# Patient Record
Sex: MALE | Race: BLACK OR AFRICAN AMERICAN
[De-identification: names, ages, dates, MRNs, and addresses within clinical notes are randomized per-mention and may not be internally consistent; named-entity substitution may affect disease eponyms.]

---

## 2019-06-02 ENCOUNTER — HOSPITAL ENCOUNTER (EMERGENCY)
Dept: HOSPITAL 92 - ERS | Age: 24
Discharge: HOME | End: 2019-06-02
Payer: COMMERCIAL

## 2019-06-02 DIAGNOSIS — F41.9: ICD-10-CM

## 2019-06-02 DIAGNOSIS — K62.89: Primary | ICD-10-CM

## 2019-06-02 LAB
BASOPHILS # BLD AUTO: 0 THOU/UL (ref 0–0.2)
BASOPHILS NFR BLD AUTO: 0.3 % (ref 0–1)
EOSINOPHIL # BLD AUTO: 0.1 THOU/UL (ref 0–0.7)
EOSINOPHIL NFR BLD AUTO: 1.1 % (ref 0–10)
HGB BLD-MCNC: 14.9 G/DL (ref 14–18)
LYMPHOCYTES # BLD: 2 THOU/UL (ref 1.2–3.4)
LYMPHOCYTES NFR BLD AUTO: 18.8 % (ref 21–51)
MCH RBC QN AUTO: 31.1 PG (ref 27–31)
MCV RBC AUTO: 90.8 FL (ref 78–98)
MONOCYTES # BLD AUTO: 1 THOU/UL (ref 0.11–0.59)
MONOCYTES NFR BLD AUTO: 9.2 % (ref 0–10)
NEUTROPHILS # BLD AUTO: 7.3 THOU/UL (ref 1.4–6.5)
NEUTROPHILS NFR BLD AUTO: 70.7 % (ref 42–75)
PLATELET # BLD AUTO: 253 THOU/UL (ref 130–400)
RBC # BLD AUTO: 4.78 MILL/UL (ref 4.7–6.1)
WBC # BLD AUTO: 10.4 THOU/UL (ref 4.8–10.8)

## 2019-06-02 PROCEDURE — 85025 COMPLETE CBC W/AUTO DIFF WBC: CPT

## 2019-06-02 PROCEDURE — 36415 COLL VENOUS BLD VENIPUNCTURE: CPT

## 2019-06-02 PROCEDURE — 82274 ASSAY TEST FOR BLOOD FECAL: CPT

## 2019-06-02 PROCEDURE — 99283 EMERGENCY DEPT VISIT LOW MDM: CPT

## 2019-12-08 ENCOUNTER — HOSPITAL ENCOUNTER (EMERGENCY)
Dept: HOSPITAL 92 - ERS | Age: 24
Discharge: HOME | End: 2019-12-08
Payer: COMMERCIAL

## 2019-12-08 DIAGNOSIS — F41.9: ICD-10-CM

## 2019-12-08 DIAGNOSIS — L01.00: ICD-10-CM

## 2019-12-08 DIAGNOSIS — J06.9: Primary | ICD-10-CM

## 2019-12-08 PROCEDURE — 71046 X-RAY EXAM CHEST 2 VIEWS: CPT

## 2019-12-08 PROCEDURE — 87804 INFLUENZA ASSAY W/OPTIC: CPT

## 2019-12-08 PROCEDURE — 87081 CULTURE SCREEN ONLY: CPT

## 2019-12-08 PROCEDURE — 87430 STREP A AG IA: CPT

## 2019-12-08 NOTE — RAD
Chest 2 views



HISTORY: Cough. Fever.



FINDINGS: Cardiac silhouette and pulmonary vasculature are unremarkable. Mediastinum is midline. Ther
e is no confluent airspace consolidation, pneumothorax, or pleural fluid evident.











IMPRESSION: Normal exam.



Reported By: MANDIE Christian 

Electronically Signed:  12/8/2019 7:56 PM

## 2020-06-16 ENCOUNTER — HOSPITAL ENCOUNTER (EMERGENCY)
Dept: HOSPITAL 92 - ERS | Age: 25
Discharge: HOME | End: 2020-06-16
Payer: COMMERCIAL

## 2020-06-16 DIAGNOSIS — F41.9: ICD-10-CM

## 2020-06-16 DIAGNOSIS — Z20.828: Primary | ICD-10-CM

## 2020-06-16 PROCEDURE — 87635 SARS-COV-2 COVID-19 AMP PRB: CPT

## 2020-06-16 PROCEDURE — 99283 EMERGENCY DEPT VISIT LOW MDM: CPT

## 2020-06-16 PROCEDURE — U0003 INFECTIOUS AGENT DETECTION BY NUCLEIC ACID (DNA OR RNA); SEVERE ACUTE RESPIRATORY SYNDROME CORONAVIRUS 2 (SARS-COV-2) (CORONAVIRUS DISEASE [COVID-19]), AMPLIFIED PROBE TECHNIQUE, MAKING USE OF HIGH THROUGHPUT TECHNOLOGIES AS DESCRIBED BY CMS-2020-01-R: HCPCS

## 2020-07-10 ENCOUNTER — HOSPITAL ENCOUNTER (EMERGENCY)
Dept: HOSPITAL 92 - ERS | Age: 25
Discharge: HOME | End: 2020-07-10
Payer: COMMERCIAL

## 2020-07-10 DIAGNOSIS — F41.9: ICD-10-CM

## 2020-07-10 DIAGNOSIS — J02.0: Primary | ICD-10-CM

## 2020-07-10 DIAGNOSIS — Z20.828: ICD-10-CM

## 2020-07-10 PROCEDURE — U0003 INFECTIOUS AGENT DETECTION BY NUCLEIC ACID (DNA OR RNA); SEVERE ACUTE RESPIRATORY SYNDROME CORONAVIRUS 2 (SARS-COV-2) (CORONAVIRUS DISEASE [COVID-19]), AMPLIFIED PROBE TECHNIQUE, MAKING USE OF HIGH THROUGHPUT TECHNOLOGIES AS DESCRIBED BY CMS-2020-01-R: HCPCS

## 2020-07-10 PROCEDURE — 99283 EMERGENCY DEPT VISIT LOW MDM: CPT

## 2020-07-10 PROCEDURE — 87635 SARS-COV-2 COVID-19 AMP PRB: CPT

## 2020-07-10 PROCEDURE — 87430 STREP A AG IA: CPT

## 2020-11-11 ENCOUNTER — HOSPITAL ENCOUNTER (EMERGENCY)
Dept: HOSPITAL 92 - ERS | Age: 25
Discharge: HOME | End: 2020-11-11
Payer: COMMERCIAL

## 2020-11-11 DIAGNOSIS — Z20.828: ICD-10-CM

## 2020-11-11 DIAGNOSIS — J02.9: Primary | ICD-10-CM

## 2020-11-11 PROCEDURE — 99283 EMERGENCY DEPT VISIT LOW MDM: CPT

## 2020-11-11 PROCEDURE — 87430 STREP A AG IA: CPT

## 2020-11-11 PROCEDURE — 87635 SARS-COV-2 COVID-19 AMP PRB: CPT

## 2020-11-11 PROCEDURE — 87081 CULTURE SCREEN ONLY: CPT

## 2020-11-11 PROCEDURE — U0003 INFECTIOUS AGENT DETECTION BY NUCLEIC ACID (DNA OR RNA); SEVERE ACUTE RESPIRATORY SYNDROME CORONAVIRUS 2 (SARS-COV-2) (CORONAVIRUS DISEASE [COVID-19]), AMPLIFIED PROBE TECHNIQUE, MAKING USE OF HIGH THROUGHPUT TECHNOLOGIES AS DESCRIBED BY CMS-2020-01-R: HCPCS
